# Patient Record
Sex: FEMALE | Race: WHITE
[De-identification: names, ages, dates, MRNs, and addresses within clinical notes are randomized per-mention and may not be internally consistent; named-entity substitution may affect disease eponyms.]

---

## 2020-10-26 ENCOUNTER — HOSPITAL ENCOUNTER (EMERGENCY)
Dept: HOSPITAL 50 - VM.ED | Age: 35
Discharge: HOME | End: 2020-10-26
Payer: MEDICAID

## 2020-10-26 DIAGNOSIS — Z91.048: ICD-10-CM

## 2020-10-26 DIAGNOSIS — N76.0: ICD-10-CM

## 2020-10-26 DIAGNOSIS — N39.0: Primary | ICD-10-CM

## 2020-10-26 NOTE — EDM.PDOC
ED HPI GENERAL MEDICAL PROBLEM





- General


Chief Complaint: OB/GYN Problem


Stated Complaint: POSSIBLE FOREIGN BODY


Time Seen by Provider: 10/26/20 17:02


Source of Information: Reports: Patient





- History of Present Illness


INITIAL COMMENTS - FREE TEXT/NARRATIVE: 





Anita is a 36 y/o female who comes to the ER with a complaint of having "a 

condom in her vagina". Patient reports that she had intercourse about a week ago

and she thinks there may be a condom in her vagina. She is having lots of 

irritation and some vaginal discharge. Denies any bleeding. She did not feel the

condom herself, but her  told it may be lost there. She is also currently

being treated for a UTI, but is unsure of the antibiotic. She is currently 

traveling though the area from [place] to [place] for a wedding. 





- Related Data


                                    Allergies











Allergy/AdvReac Type Severity Reaction Status Date / Time


 


iodine Allergy  Cannot Verified 10/26/20 17:10





   Remember  











Home Meds: 


                                    Home Meds





metroNIDAZOLE [Metronidazole] 500 mg PO BID #14 tablet 10/26/20 [Rx]











Review of Systems





- Review of Systems


Review Of Systems: See Below


Constitutional: Reports: No Symptoms


Eyes: Reports: No Symptoms


Ears: Reports: No Symptoms


Nose: Reports: No Symptoms


Mouth/Throat: Reports: No Symptoms


Respiratory: Reports: No Symptoms


Cardiovascular: Reports: No Symptoms


GI/Abdominal: Reports: No Symptoms


Genitourinary: Reports: Other (Vaginal irritation)


Musculoskeletal: Reports: No Symptoms


Skin: Reports: No Symptoms


Neurological: Reports: No Symptoms


Psychiatric: Reports: No Symptoms





ED EXAM, GENERAL





- Physical Exam


Exam: See Below


Exam Limited By: No Limitations


General Appearance: Alert, WD/WN, No Apparent Distress (Adult female)


Ears: Hearing Grossly Normal


Head: Atraumatic, Normocephalic


Respiratory/Chest: No Respiratory Distress


GI/Abdominal: Soft


 (Female) Exam: Normal External Exam, Vaginal Discharge (moderate amount of 

white, milky discharge noted in vagina, vaginal walls rugious, cervic 

nulliparous and note clear fluid at os)


Rectal (Female) Exam: Deferred


Back Exam: Normal Inspection


Extremities: Normal Inspection


Neurological: Alert, Oriented


Psychiatric: Normal Affect, Normal Mood


Skin Exam: Warm, Dry, Intact, Normal Color


Lymphatic: No Adenopathy





Course





- Vital Signs


Text/Narrative:: 





1702 The patient was seen by the CNP. Speculum exam done. Wet prep obtained.





1731 Wet prep results reviewed: Neg yeast, Neg Trich, Few Clue Cells present. 

Since patient is symptomatic and has clue cells, will treat for BV.


Results discussed with patient. Questions answered. She let the ER in stable 

condition after instructions were given.











Last Recorded V/S: 


                                Last Vital Signs











Temp  36.9 C   10/26/20 16:55


 


Pulse  89   10/26/20 16:55


 


Resp  16   10/26/20 16:55


 


BP  140/88   10/26/20 16:55


 


Pulse Ox  99   10/26/20 16:55














Departure





- Departure


Time of Disposition: 17:33


Disposition: Home, Self-Care 01


Condition: Good


Clinical Impression: 


 Bacterial vaginosis





Urinary tract infection


Qualifiers:


 Urinary tract infection type: site unspecified 








- Discharge Information


Prescriptions: 


metroNIDAZOLE [Metronidazole] 500 mg PO BID #14 tablet


Instructions:  Urinary Tract Infection, Adult, Bacterial Vaginosis


Referrals: 


PCP,None [Primary Care Provider] - 


Forms:  ED Department Discharge





Sepsis Event Note (ED)





- Evaluation


Sepsis Screening Result: No Definite Risk





- Focused Exam


Vital Signs: 


                                   Vital Signs











  Temp Pulse Resp BP Pulse Ox


 


 10/26/20 16:55  36.9 C  89  16  140/88  99














- Assessment/Plan


Assessment:: 





1)Bacterial Vaginosis


2)UTI, resolving


Plan: 





-Metronidiazole 500 mg oral 2x daily #14(Rx)





-Avoid douching, tampons, or intercourse until medication is completed.





-Continue antibiotic you were given for the UTI





-Follow up with your PCP if you have further concerns or return to the nearest 

ER.